# Patient Record
Sex: MALE | Race: WHITE | URBAN - NONMETROPOLITAN AREA
[De-identification: names, ages, dates, MRNs, and addresses within clinical notes are randomized per-mention and may not be internally consistent; named-entity substitution may affect disease eponyms.]

---

## 2021-03-02 ENCOUNTER — CLAIMS CREATED FROM THE CLAIM WINDOW (OUTPATIENT)
Dept: URBAN - NONMETROPOLITAN AREA CLINIC 13 | Facility: CLINIC | Age: 21
End: 2021-03-02
Payer: SELF-PAY

## 2021-03-02 ENCOUNTER — WEB ENCOUNTER (OUTPATIENT)
Dept: URBAN - NONMETROPOLITAN AREA CLINIC 13 | Facility: CLINIC | Age: 21
End: 2021-03-02

## 2021-03-02 ENCOUNTER — DASHBOARD ENCOUNTERS (OUTPATIENT)
Age: 21
End: 2021-03-02

## 2021-03-02 DIAGNOSIS — R19.8 ALTERED BOWEL FUNCTION: ICD-10-CM

## 2021-03-02 PROCEDURE — 99203 OFFICE O/P NEW LOW 30 MIN: CPT | Performed by: INTERNAL MEDICINE

## 2021-03-02 NOTE — HPI-TODAY'S VISIT:
Barry is a very pleasant, very healthy 20-year-old college student from [place] who is here for altered bowel habits.  He states that for the last couple years he has had intermittent loose stools.  He will wake up in the morning and have a bowel movement.  Sometimes he will have up to 2-3 bowel movements a day.  This is not associated with any pain, nausea, vomiting, weight loss, or rectal bleeding.  He does also work at [employer] and is usually able to get through his shift without having to go to the bathroom.  However sometimes he does have to go to bathroom during his work.  He cannot think of any particular food stuffs.  He has not had GI issues as a child.  He states that his parents were concerned about this to him.

## 2021-03-02 NOTE — PHYSICAL EXAM CARDIOVASCULAR:
Patient was not available for their therapy session at this time.  Reason not seen: Nursing with patient (08/10/19 0800).    Re-Attempt Plan: Will re-attempt later today;Will re-attempt tomorrow (08/10/19 0800).   no edema, no murmurs, regular rate and rhythm